# Patient Record
Sex: FEMALE | Employment: UNEMPLOYED | URBAN - METROPOLITAN AREA
[De-identification: names, ages, dates, MRNs, and addresses within clinical notes are randomized per-mention and may not be internally consistent; named-entity substitution may affect disease eponyms.]

---

## 2018-11-27 ENCOUNTER — TRANSFERRED RECORDS (OUTPATIENT)
Dept: HEALTH INFORMATION MANAGEMENT | Facility: CLINIC | Age: 58
End: 2018-11-27

## 2019-03-05 DIAGNOSIS — R31.9 HEMATURIA: Primary | ICD-10-CM

## 2019-03-07 ENCOUNTER — OFFICE VISIT (OUTPATIENT)
Dept: UROLOGY | Facility: CLINIC | Age: 59
End: 2019-03-07
Payer: COMMERCIAL

## 2019-03-07 VITALS
HEART RATE: 94 BPM | SYSTOLIC BLOOD PRESSURE: 122 MMHG | BODY MASS INDEX: 18.51 KG/M2 | OXYGEN SATURATION: 98 % | DIASTOLIC BLOOD PRESSURE: 86 MMHG | WEIGHT: 125 LBS | HEIGHT: 69 IN

## 2019-03-07 DIAGNOSIS — R31.21 ASYMPTOMATIC MICROSCOPIC HEMATURIA: ICD-10-CM

## 2019-03-07 DIAGNOSIS — K40.90 RIGHT INGUINAL HERNIA: Primary | ICD-10-CM

## 2019-03-07 LAB
ALBUMIN UR-MCNC: NEGATIVE MG/DL
APPEARANCE UR: CLEAR
BILIRUB UR QL STRIP: NEGATIVE
COLOR UR AUTO: YELLOW
GLUCOSE UR STRIP-MCNC: NEGATIVE MG/DL
HGB UR QL STRIP: ABNORMAL
KETONES UR STRIP-MCNC: NEGATIVE MG/DL
LEUKOCYTE ESTERASE UR QL STRIP: NEGATIVE
NITRATE UR QL: NEGATIVE
PH UR STRIP: 6.5 PH (ref 5–7)
SOURCE: ABNORMAL
SP GR UR STRIP: 1.02 (ref 1–1.03)
UROBILINOGEN UR STRIP-ACNC: 0.2 EU/DL (ref 0.2–1)

## 2019-03-07 PROCEDURE — 88112 CYTOPATH CELL ENHANCE TECH: CPT | Performed by: UROLOGY

## 2019-03-07 PROCEDURE — 99244 OFF/OP CNSLTJ NEW/EST MOD 40: CPT | Performed by: UROLOGY

## 2019-03-07 PROCEDURE — 81003 URINALYSIS AUTO W/O SCOPE: CPT | Performed by: UROLOGY

## 2019-03-07 RX ORDER — OMEGA-3-ACID ETHYL ESTERS 900 MG/1
CAPSULE, LIQUID FILLED ORAL
COMMUNITY

## 2019-03-07 ASSESSMENT — PAIN SCALES - GENERAL: PAINLEVEL: NO PAIN (0)

## 2019-03-07 ASSESSMENT — MIFFLIN-ST. JEOR: SCORE: 1201.38

## 2019-03-07 NOTE — PROGRESS NOTES
History: It is a great pleasure to see this very pleasant 58-year-old lady in initial consultation today at the request of Dr. Fried.  She has been found to have microscopic hematuria, but gross hematuria has not been reported.  She has no significant voiding symptoms.  There is no history of urinary tract infection.  She is on no significant medications, has had no prior surgery or no other significant major medical problems.  She has been diagnosed with a right inguinal hernia which is not been treated.  There were no other significant features in the history    History reviewed. No pertinent past medical history.    History reviewed. No pertinent surgical history.    History reviewed. No pertinent family history.    Social History     Socioeconomic History     Marital status:      Spouse name: Not on file     Number of children: Not on file     Years of education: Not on file     Highest education level: Not on file   Occupational History     Not on file   Social Needs     Financial resource strain: Not on file     Food insecurity:     Worry: Not on file     Inability: Not on file     Transportation needs:     Medical: Not on file     Non-medical: Not on file   Tobacco Use     Smoking status: Never Smoker     Smokeless tobacco: Never Used   Substance and Sexual Activity     Alcohol use: Not on file     Drug use: Not on file     Sexual activity: Not on file   Lifestyle     Physical activity:     Days per week: Not on file     Minutes per session: Not on file     Stress: Not on file   Relationships     Social connections:     Talks on phone: Not on file     Gets together: Not on file     Attends Jew service: Not on file     Active member of club or organization: Not on file     Attends meetings of clubs or organizations: Not on file     Relationship status: Not on file     Intimate partner violence:     Fear of current or ex partner: Not on file     Emotionally abused: Not on file     Physically  "abused: Not on file     Forced sexual activity: Not on file   Other Topics Concern     Parent/sibling w/ CABG, MI or angioplasty before 65F 55M? Not Asked   Social History Narrative     Not on file       Current Outpatient Medications   Medication Sig Dispense Refill     Omega-3-acid Ethyl Esters (FISH OIL OMEGA-3 FATTY ACID) 320MG/ML oral suspension        vitamin D3 (CHOLECALCIFEROL) 1000 units (25 mcg) tablet Take by mouth daily         Review Of Systems:  Skin: negative  Eyes: negative  Ears/Nose/Throat: negative  Respiratory: No shortness of breath, dyspnea on exertion, cough, or hemoptysis  Cardiovascular: negative  Gastrointestinal: Right inguinal hernia  Genitourinary: hematuria  Musculoskeletal: negative  Neurologic: negative  Psychiatric: negative  Hematologic/Lymphatic/Immunologic: negative  Endocrine: negative    Exam:  /86 (BP Location: Left arm, Patient Position: Sitting, Cuff Size: Adult Regular)   Pulse 94   Ht 1.753 m (5' 9\")   Wt 56.7 kg (125 lb)   SpO2 98%   BMI 18.46 kg/m      General Impression: Very pleasant lady in no acute distress, well oriented in time place and person.    Mental status.  Normal      HEENT: There is no clinical evidence of jaundice in the mucous membranes are normal    Skin: Skin is normal to examination    Lymph Nodes: There is no inguinal lymphadenopathy    Respiratory System: The respiratory cycle is normal    Cardiovascular: There is no significant peripheral edema    Abdominal: Nonobese abdomen, no palpable masses small right inguinal hernia observed with a slight bulge in the region of the deep inguinal ring.  No other remarkable features    Extremities: Extremities otherwise unremarkable    Back and Flank: No evidence of kyphosis, scoliosis or lordosis and no flank tenderness    Genital: Not examined    Rectal: Not examined    Neurologic: There are no focal abnormal clinical neurological signs in the central, or peripheral nervous systems    Impression: " "She does have microscopic hematuria, and is asymptomatic and there is no smoking history.  We will arrange for CT scan of the abdomen and pelvis with and without contrast and a cystoscopy and urine cytology.  Usually the yield from investigation for microscopic hematuria is low and if we find no evidence of any sinister etiology then the recommendation would be not to pay any attention to it in the future unless she develops gross hematuria which case I would wish to see her promptly.  I had a careful and lengthy discussion with her about the sort of conditions which we wish to exclude including all forms of renal and bladder cancer as well as kidney stones etc.  I did discuss the entire situation with her in detail.  I answered many questions    Plan: CT abdomen and pelvis with and without contrast, cystoscopy and urine cytology    \"This dictation was performed with voice recognition software and may contain errors,  omissions and inadvertent word substitution.\"    "

## 2019-03-07 NOTE — LETTER
Date:March 8, 2019      Patient was self referred, no letter generated. Do not send.        [facility] Health Information

## 2019-03-07 NOTE — LETTER
3/7/2019       RE: Bernie Hernandez  [address] [place] [address]     Dear Colleague,    Thank you for referring your patient, Bernie Hernandez, to the [facility] at [facility]. Please see a copy of my visit note below.    History: It is a great pleasure to see this very pleasant 58-year-old lady in initial consultation today at the request of Dr. Fried.  She has been found to have microscopic hematuria, but gross hematuria has not been reported.  She has no significant voiding symptoms.  There is no history of urinary tract infection.  She is on no significant medications, has had no prior surgery or no other significant major medical problems.  She has been diagnosed with a right inguinal hernia which is not been treated.  There were no other significant features in the history    History reviewed. No pertinent past medical history.    History reviewed. No pertinent surgical history.    History reviewed. No pertinent family history.    Social History     Socioeconomic History     Marital status:      Spouse name: Not on file     Number of children: Not on file     Years of education: Not on file     Highest education level: Not on file   Occupational History     Not on file   Social Needs     Financial resource strain: Not on file     Food insecurity:     Worry: Not on file     Inability: Not on file     Transportation needs:     Medical: Not on file     Non-medical: Not on file   Tobacco Use     Smoking status: Never Smoker     Smokeless tobacco: Never Used   Substance and Sexual Activity     Alcohol use: Not on file     Drug use: Not on file     Sexual activity: Not on file   Lifestyle     Physical activity:     Days per week: Not on file     Minutes per session: Not on file     Stress: Not on file   Relationships     Social connections:     Talks on phone: Not on file     Gets together: Not on file     Attends Protestant  "service: Not on file     Active member of club or organization: Not on file     Attends meetings of clubs or organizations: Not on file     Relationship status: Not on file     Intimate partner violence:     Fear of current or ex partner: Not on file     Emotionally abused: Not on file     Physically abused: Not on file     Forced sexual activity: Not on file   Other Topics Concern     Parent/sibling w/ CABG, MI or angioplasty before 65F 55M? Not Asked   Social History Narrative     Not on file       Current Outpatient Medications   Medication Sig Dispense Refill     Omega-3-acid Ethyl Esters (FISH OIL OMEGA-3 FATTY ACID) 320MG/ML oral suspension        vitamin D3 (CHOLECALCIFEROL) 1000 units (25 mcg) tablet Take by mouth daily         Review Of Systems:  Skin: negative  Eyes: negative  Ears/Nose/Throat: negative  Respiratory: No shortness of breath, dyspnea on exertion, cough, or hemoptysis  Cardiovascular: negative  Gastrointestinal: Right inguinal hernia  Genitourinary: hematuria  Musculoskeletal: negative  Neurologic: negative  Psychiatric: negative  Hematologic/Lymphatic/Immunologic: negative  Endocrine: negative    Exam:  /86 (BP Location: Left arm, Patient Position: Sitting, Cuff Size: Adult Regular)   Pulse 94   Ht 1.753 m (5' 9\")   Wt 56.7 kg (125 lb)   SpO2 98%   BMI 18.46 kg/m       General Impression: Very pleasant lady in no acute distress, well oriented in time place and person.    Mental status.  Normal      HEENT: There is no clinical evidence of jaundice in the mucous membranes are normal    Skin: Skin is normal to examination    Lymph Nodes: There is no inguinal lymphadenopathy    Respiratory System: The respiratory cycle is normal    Cardiovascular: There is no significant peripheral edema    Abdominal: Nonobese abdomen, no palpable masses small right inguinal hernia observed with a slight bulge in the region of the deep inguinal ring.  No other remarkable features    Extremities: " "Extremities otherwise unremarkable    Back and Flank: No evidence of kyphosis, scoliosis or lordosis and no flank tenderness    Genital: Not examined    Rectal: Not examined    Neurologic: There are no focal abnormal clinical neurological signs in the central, or peripheral nervous systems    Impression: She does have microscopic hematuria, and is asymptomatic and there is no smoking history.  We will arrange for CT scan of the abdomen and pelvis with and without contrast and a cystoscopy and urine cytology.  Usually the yield from investigation for microscopic hematuria is low and if we find no evidence of any sinister etiology then the recommendation would be not to pay any attention to it in the future unless she develops gross hematuria which case I would wish to see her promptly.  I had a careful and lengthy discussion with her about the sort of conditions which we wish to exclude including all forms of renal and bladder cancer as well as kidney stones etc.  I did discuss the entire situation with her in detail.  I answered many questions    Plan: CT abdomen and pelvis with and without contrast, cystoscopy and urine cytology    \"This dictation was performed with voice recognition software and may contain errors,  omissions and inadvertent word substitution.\"      Again, thank you for allowing me to participate in the care of your patient.      Sincerely,    Blake Izaguirre MD      "

## 2019-03-07 NOTE — NURSING NOTE
Chief Complaint   Patient presents with     Clinic Care Coordination - Follow-up     Pt here for microhematuria     Bhavani Madison CMA

## 2019-03-11 LAB — COPATH REPORT: NORMAL

## 2019-03-27 ENCOUNTER — OFFICE VISIT (OUTPATIENT)
Dept: UROLOGY | Facility: CLINIC | Age: 59
End: 2019-03-27
Payer: COMMERCIAL

## 2019-03-27 ENCOUNTER — HOSPITAL ENCOUNTER (OUTPATIENT)
Dept: CT IMAGING | Facility: CLINIC | Age: 59
Discharge: HOME | End: 2019-03-27
Attending: UROLOGY | Admitting: UROLOGY
Payer: COMMERCIAL

## 2019-03-27 VITALS — HEART RATE: 80 BPM | BODY MASS INDEX: 18.51 KG/M2 | WEIGHT: 125 LBS | HEIGHT: 69 IN

## 2019-03-27 DIAGNOSIS — R31.29 MICROSCOPIC HEMATURIA: Primary | ICD-10-CM

## 2019-03-27 DIAGNOSIS — R31.21 ASYMPTOMATIC MICROSCOPIC HEMATURIA: ICD-10-CM

## 2019-03-27 LAB
ALBUMIN UR-MCNC: NEGATIVE MG/DL
APPEARANCE UR: CLEAR
BILIRUB UR QL STRIP: NEGATIVE
COLOR UR AUTO: YELLOW
GLUCOSE UR STRIP-MCNC: NEGATIVE MG/DL
HGB UR QL STRIP: ABNORMAL
KETONES UR STRIP-MCNC: NEGATIVE MG/DL
LEUKOCYTE ESTERASE UR QL STRIP: NEGATIVE
NITRATE UR QL: NEGATIVE
PH UR STRIP: 7 PH (ref 5–7)
SOURCE: ABNORMAL
SP GR UR STRIP: 1.01 (ref 1–1.03)
UROBILINOGEN UR STRIP-ACNC: 0.2 EU/DL (ref 0.2–1)

## 2019-03-27 PROCEDURE — 25000125 ZZHC RX 250: Performed by: UROLOGY

## 2019-03-27 PROCEDURE — 52000 CYSTOURETHROSCOPY: CPT | Performed by: UROLOGY

## 2019-03-27 PROCEDURE — 25000128 H RX IP 250 OP 636: Performed by: UROLOGY

## 2019-03-27 PROCEDURE — 81003 URINALYSIS AUTO W/O SCOPE: CPT | Performed by: UROLOGY

## 2019-03-27 PROCEDURE — 74178 CT ABD&PLV WO CNTR FLWD CNTR: CPT

## 2019-03-27 PROCEDURE — 99213 OFFICE O/P EST LOW 20 MIN: CPT | Mod: 25 | Performed by: UROLOGY

## 2019-03-27 RX ORDER — IOPAMIDOL 755 MG/ML
100 INJECTION, SOLUTION INTRAVASCULAR ONCE
Status: COMPLETED | OUTPATIENT
Start: 2019-03-27 | End: 2019-03-27

## 2019-03-27 RX ORDER — CIPROFLOXACIN 500 MG/1
500 TABLET, FILM COATED ORAL ONCE
Qty: 1 TABLET | Refills: 0 | Status: SHIPPED | OUTPATIENT
Start: 2019-03-27 | End: 2019-03-27

## 2019-03-27 RX ADMIN — IOPAMIDOL 100 ML: 755 INJECTION, SOLUTION INTRAVENOUS at 13:18

## 2019-03-27 RX ADMIN — SODIUM CHLORIDE 60 ML: 9 INJECTION, SOLUTION INTRAVENOUS at 13:18

## 2019-03-27 ASSESSMENT — MIFFLIN-ST. JEOR: SCORE: 1201.38

## 2019-03-27 ASSESSMENT — PAIN SCALES - GENERAL: PAINLEVEL: NO PAIN (0)

## 2019-03-27 NOTE — PROGRESS NOTES
This very pleasant 58-year-old lady returns today for cystoscopy for further evaluation of asymptomatic microscopic hematuria.  CYTOLOGIC INTERPRETATION:      Urine, voided:   Negative for High-Grade Urothelial Carcinoma   Crystals present.   Specimen Adequacy: Satisfactory for evaluation.     I have personally reviewed all specimens and/or slides, including the   listed special stains, and used them   with my medical judgement to determine or confirm the final diagnosis.     Electronically signed out by:     Cullen Vincent M.D    Study Result     CT ABDOMEN AND PELVIS WITHOUT AND WITH CONTRAST  3/27/2019 1:50 PM       HISTORY: Unknown cause asymptomatic microscopic hematuria.     COMPARISON: None.     TECHNIQUE: Volumetric helical acquisition of CT images from the lung  bases through the symphysis pubis before and after the uneventful  administration of 100 mL Isovue-370 intravenous contrast. Radiation  dose for this scan was reduced using automated exposure control,  adjustment of the mA and/or kV according to patient size, or iterative  reconstruction technique.     FINDINGS: There are no stones seen within either kidney, either  ureter, or the bladder. There is no hydroureter or hydronephrosis.  There is no perinephric fat stranding. Kidneys are normal in size and  configuration. No suspicious filling defects seen in the opacified  intrarenal collecting systems, ureters, or bladder to suggest a  urothelial malignancy.  The liver, spleen, adrenal glands, and  pancreas demonstrate no worrisome focal lesion.  Low-attenuation  subcentimeter liver lesion(s) compatible with benign cysts or other  benign lesions. No specific evaluation or follow-up is recommended in  a low risk patient. There are mild atherosclerotic changes of the  visualized aorta and its branches. There is no evidence of aortic  dissection or aneurysm. Unremarkable gallbladder. Visualized portions  of the appendix unremarkable.  No free fluid. No  free air in the  abdomen. There are no abdominal or pelvic lymph nodes that are  abnormal by size criteria.  There are no dilated loops of small  intestine or large bowel to suggest ileus or obstruction.The  visualized lung bases are unremarkable. Bone windows reveal no  destructive lesions.                                                                        IMPRESSION:   1. No evidence for renal, ureteral, or bladder calculi.  2. No masses or suspicious filling defects within the opacified  urinary collecting system.      Procedure.  Cystoscopy.   Surgeon.    Zina  Anesthesia.  Local anesthesia.  Description.  With the patient in the dorsal lithotomy position, with the genital area prepped and draped in the customary fashion, with local anesthetic and the urethra, the flexible cystoscope was Inserted.  The urethra is unremarkable.  The walls of the urethra coapts satisfactorily.  The interior of the bladder was carefully inspected.  There is no evidence of neoplasm or stone in the bladder.  There is no significant trabeculation.  The ureteric orifices are in good position with normal E flux from each.  There are no other remarkable features.    Impression.  I have not found a serious cause asymptomatic microscopic hematuria.  The urine cytology and the CT scans were quite normal in addition to the cystoscopy  At this point if this is observed in the future I recommend no further investigation.  However if gross hematuria is observed I would wish to see her promptly.  In addition she explained to me that she was getting some problems with urgency and some urge incontinence particularly in the morning after drinking caffeinated beverages.  I have therefore recommended she discontinue caffeinated beverages to see how this improves her symptoms as I suspect it well.  I do not think we need to consider other measures for the problems with urgency other than those related to dietary measures.  I did discuss the  "situation very carefully to the patient in detail today.  I answered all her questions.    Plan.  I will see her on a as needed basis.    Time.  15 minutes was spent in addition to the procedure in order to review both the laboratory studies and radiologic studies as well as the discussion regarding the findings of cystoscopy.  In addition we also had discussions of prior problems with urgency and urge incontinence and therapeutic strategies that may be beneficial for her at the present time     \"This dictation was performed with voice recognition software and may contain errors,  omissions and inadvertent word substitution.\"    "

## 2019-03-27 NOTE — LETTER
Date:March 28, 2019      Patient was self referred, no letter generated. Do not send.        [facility] Health Information

## 2019-03-27 NOTE — PATIENT INSTRUCTIONS
"AFTER YOUR CYSTOSCOPY  ?  ?  You have just completed a cystoscopy, or \"cysto\", which allowed your physician to learn more about your bladder (or to remove a stent placed after surgery). We suggest that you continue to avoid caffeine, fruit juice, and alcohol for the next 24 hours, however, you are encouraged to return to your normal activities.  ?  ?  A few things that are considered normal after your cystoscopy:  ?  * small amount of bleeding (or spotting) that clears within the next 24 hours  ?  * slight burning sensation with urination  ?  * sensation of needing to void (urinate) more frequently  ?  * the feeling of \"air\" in your urine  ?  * mild discomfort that is relieved with Tylenol    * bladder spasms  ?  ?  ?  Please contact our office promptly if you:  ?  * develop a fever above 101 degrees  ?  * are unable to urinate  ?  * develop bright red blood that does not stop  ?  * experience severe pain or swelling  ?  ?  ?  And of course, please contact our office with any concerns or questions [phone]  ?        AFTER YOUR CYSTOSCOPY        You have just completed a cystoscopy, or \"cysto\", which allowed your physician to learn more about your bladder (or to remove a stent placed after surgery). We suggest that you continue to avoid caffeine, fruit juice, and alcohol for the next 24 hours, however, you are encouraged to return to your normal activities.         A few things that are considered normal after your cystoscopy:     * Small amount of bleeding (or spotting) that clears within the next 24 hours     * Slight burning sensation with urination     * Sensation to of needing to avoid more frequently     * The feeling of \"air\" in your urine     * Mild discomfort that is relieved with Tylenol        Please contact our office promptly if you:     * Develop a fever above 101 degrees     * Are unable to urinate     * Develop bright red blood that does not stop     * Severe pain or swelling         Please " contact our office with any concerns or questions @[facility].

## 2019-03-27 NOTE — NURSING NOTE
